# Patient Record
Sex: FEMALE | Race: WHITE | NOT HISPANIC OR LATINO | Employment: OTHER | URBAN - METROPOLITAN AREA
[De-identification: names, ages, dates, MRNs, and addresses within clinical notes are randomized per-mention and may not be internally consistent; named-entity substitution may affect disease eponyms.]

---

## 2019-07-02 ENCOUNTER — TRANSCRIBE ORDERS (OUTPATIENT)
Dept: NEUROSURGERY | Facility: CLINIC | Age: 59
End: 2019-07-02

## 2019-07-02 DIAGNOSIS — G89.4 CHRONIC PAIN SYNDROME: Primary | ICD-10-CM

## 2019-07-22 ENCOUNTER — CONSULT (OUTPATIENT)
Dept: NEUROSURGERY | Facility: CLINIC | Age: 59
End: 2019-07-22
Payer: COMMERCIAL

## 2019-07-22 VITALS
DIASTOLIC BLOOD PRESSURE: 70 MMHG | BODY MASS INDEX: 30.64 KG/M2 | HEIGHT: 67 IN | SYSTOLIC BLOOD PRESSURE: 148 MMHG | RESPIRATION RATE: 16 BRPM | TEMPERATURE: 97.7 F | WEIGHT: 195.2 LBS

## 2019-07-22 DIAGNOSIS — M96.1 POSTLAMINECTOMY SYNDROME: ICD-10-CM

## 2019-07-22 DIAGNOSIS — G89.4 CHRONIC PAIN SYNDROME: Primary | ICD-10-CM

## 2019-07-22 PROCEDURE — 99205 OFFICE O/P NEW HI 60 MIN: CPT | Performed by: NEUROLOGICAL SURGERY

## 2019-07-22 RX ORDER — BIOTIN 1 MG
TABLET ORAL
COMMUNITY

## 2019-07-22 RX ORDER — ATORVASTATIN CALCIUM 20 MG/1
TABLET, FILM COATED ORAL
COMMUNITY
Start: 2019-06-07

## 2019-07-22 RX ORDER — ESZOPICLONE 2 MG/1
2 TABLET, FILM COATED ORAL
COMMUNITY
Start: 2019-04-15

## 2019-07-22 RX ORDER — ASPIRIN 81 MG/1
81 TABLET, CHEWABLE ORAL
COMMUNITY
Start: 2017-05-04 | End: 2019-08-09 | Stop reason: HOSPADM

## 2019-07-22 RX ORDER — DULOXETIN HYDROCHLORIDE 60 MG/1
CAPSULE, DELAYED RELEASE ORAL
COMMUNITY
Start: 2019-06-10

## 2019-07-22 RX ORDER — CHLORHEXIDINE GLUCONATE 0.12 MG/ML
15 RINSE ORAL ONCE
Status: CANCELLED | OUTPATIENT
Start: 2019-08-09 | End: 2019-07-22

## 2019-07-22 RX ORDER — VANCOMYCIN HYDROCHLORIDE 1 G/200ML
1000 INJECTION, SOLUTION INTRAVENOUS ONCE
Status: CANCELLED | OUTPATIENT
Start: 2019-08-09 | End: 2019-07-22

## 2019-07-22 RX ORDER — RANITIDINE 150 MG/1
150 TABLET ORAL
COMMUNITY
Start: 2018-11-12

## 2019-07-22 RX ORDER — TIZANIDINE 4 MG/1
4 TABLET ORAL
COMMUNITY
Start: 2019-04-15 | End: 2019-08-08 | Stop reason: SDUPTHER

## 2019-07-22 RX ORDER — METHADONE HYDROCHLORIDE 5 MG/1
TABLET ORAL
COMMUNITY
Start: 2019-03-21

## 2019-07-22 RX ORDER — FLUTICASONE PROPIONATE 50 MCG
2 SPRAY, SUSPENSION (ML) NASAL
COMMUNITY
Start: 2019-04-15

## 2019-07-22 RX ORDER — GABAPENTIN 800 MG/1
800 TABLET ORAL 3 TIMES DAILY
COMMUNITY
Start: 2019-02-16

## 2019-07-22 RX ORDER — AMLODIPINE BESYLATE 5 MG/1
5 TABLET ORAL
COMMUNITY
Start: 2019-05-03

## 2019-07-22 NOTE — H&P (VIEW-ONLY)
Assessment/Plan:    No problem-specific Assessment & Plan notes found for this encounter  Patient is stable  Symptoms, as detailed in HPI, continue to significantly impact of patient's quality of life in daily activities  After carefully considering presentation, investigations, functional status and co-morbidities, the risk/benefit profile of surgical intervention is favorable  History, physical examination and diagnostic tests were reviewed and questions answered  Diagnosis, care plan and treatment options were discussed  The patient understand instructions and will follow up as directed  OR for thoracic laminectomy for spinal cord sitmulator implant  Carrie Estevez  She got 80% relief of pain during the trial  We will remove her old stimulator which does not provide any relief  Expected postoperative course, including activity restrictions, expected pain and postoperative medication were reviewed  Patient provided verbal consent to surgical procedure and signed consent form: Yes    We also discussed the risks and benefits of the procedure the risks being including: infection (~2%), neurologic injury (<1%), new pain, revisions surgery, failure to relieve pain, hardware issues  The benefits including relief of pain comparable to trial  The patient stated understanding of the risks and benefits and agreed to proceed  Diagnoses and all orders for this visit:    Chronic pain syndrome  -     Ambulatory referral to Neurosurgery  -     UA w Reflex to Microscopic w Reflex to Culture  -     Comprehensive metabolic panel; Future  -     CBC and differential; Future  -     APTT; Future  -     Protime-INR; Future  -     HEMOGLOBIN A1C W/ EAG ESTIMATION; Future  -     Case request operating room: LAMINECTOMY THORACIC FOR INSERTION DORSAL COLUMN SPINAL CORD STIMULATOR (DCS) W IMPLANTABLE PULSE GENERATOR, RIGHT BUTTOCK   REMOVAL OF PRIOR SPINAL CORD STIMULATOR; Standing  -     Case request operating room: LAMINECTOMY THORACIC FOR INSERTION DORSAL COLUMN SPINAL CORD STIMULATOR (DCS) W IMPLANTABLE PULSE GENERATOR, RIGHT BUTTOCK  REMOVAL OF PRIOR SPINAL CORD STIMULATOR    Postlaminectomy syndrome  -     Case request operating room: LAMINECTOMY THORACIC FOR INSERTION DORSAL COLUMN SPINAL CORD STIMULATOR (DCS) W IMPLANTABLE PULSE GENERATOR, RIGHT BUTTOCK  REMOVAL OF PRIOR SPINAL CORD STIMULATOR; Standing  -     Case request operating room: LAMINECTOMY THORACIC FOR INSERTION DORSAL COLUMN SPINAL CORD STIMULATOR (DCS) W IMPLANTABLE PULSE GENERATOR, RIGHT BUTTOCK  REMOVAL OF PRIOR SPINAL CORD STIMULATOR    Other orders  -     Calcium Carbonate-Vitamin D (CALCIUM 500/D PO); ONE TABLET BID  -     amLODIPine (NORVASC) 5 mg tablet; Take 5 mg by mouth  -     aspirin 81 mg chewable tablet; Chew 81 mg  -     atorvastatin (LIPITOR) 20 mg tablet; TAKE 1 TABLET BY MOUTH EVERY DAY  -     Cholecalciferol (VITAMIN D3) 1000 units CAPS; Take by mouth  -     DULoxetine (CYMBALTA) 60 mg delayed release capsule; TAKE 1 CAPSULE BY MOUTH EVERY DAY  -     eszopiclone (LUNESTA) 2 mg tablet; Take 2 mg by mouth  -     fluticasone (FLONASE) 50 mcg/act nasal spray; 2 sprays into each nostril  -     gabapentin (NEURONTIN) 800 mg tablet; Take 800 mg by mouth  -     methadone (DOLOPHINE) 5 mg tablet; TAKE 1/2 TABLET (2 5MG) BY MOUTH EVERY 8 HOURS FOR PAIN (ONGOING THERAPY)  -     ranitidine (ZANTAC) 150 mg tablet; Take 150 mg by mouth  -     tiZANidine (ZANAFLEX) 4 mg tablet;  Take 4 mg by mouth  -     Diet NPO; Sips with meds; Standing  -     Nursing Communication [facility] Interventions Implemented; Standing  -     Nursing Communication Use 2 CHG cloths, have the patient wash his/her body from the neck down or have staff wash entire body (from neck down) if patient is unable; Standing  -     Nursing Communication Swab both nares with Povidone-Iodine solution, EXCLUDE if patient has shellfish/Iodine allergy; Standing  -     chlorhexidine ([facility]) 0 12 % oral rinse 15 mL  -     Void on call to OR; Standing  -     Insert peripheral IV; Standing  -     vancomycin (VANCOCIN) 1,000 mg in sodium chloride 0 9 % 500 mL IVPB          Subjective:      Patient ID: Maranda Manning is a 63 y o  female  Patient is a 63year old female with symptoms of low back and hip down the leg pain  Pain is severe throbbing in quality  She had a prior stimualtor and has lost efficacy because they have migrated per patient  She sees Dr Srinivasan and she did well with a [employer] trial with 80% relief of pain  And improvement in ambulation and sleep  The following portions of the patient's history were reviewed and updated as appropriate:   She  has a past medical history of Hypertension  She There are no active problems to display for this patient  She  has a past surgical history that includes Cholecystectomy; Lumbar fusion; and Foot surgery (Right)  Her family history is not on file  She  has no tobacco, alcohol, and drug history on file    Current Outpatient Medications   Medication Sig Dispense Refill    amLODIPine (NORVASC) 5 mg tablet Take 5 mg by mouth      aspirin 81 mg chewable tablet Chew 81 mg      atorvastatin (LIPITOR) 20 mg tablet TAKE 1 TABLET BY MOUTH EVERY DAY      Calcium Carbonate-Vitamin D (CALCIUM 500/D PO) ONE TABLET BID      DULoxetine (CYMBALTA) 60 mg delayed release capsule TAKE 1 CAPSULE BY MOUTH EVERY DAY      eszopiclone (LUNESTA) 2 mg tablet Take 2 mg by mouth      fluticasone (FLONASE) 50 mcg/act nasal spray 2 sprays into each nostril      gabapentin (NEURONTIN) 800 mg tablet Take 800 mg by mouth      methadone (DOLOPHINE) 5 mg tablet TAKE 1/2 TABLET (2 5MG) BY MOUTH EVERY 8 HOURS FOR PAIN (ONGOING THERAPY)      ranitidine (ZANTAC) 150 mg tablet Take 150 mg by mouth      tiZANidine (ZANAFLEX) 4 mg tablet Take 4 mg by mouth      Cholecalciferol (VITAMIN D3) 1000 units CAPS Take by mouth       No current facility-administered medications for this visit  Current Outpatient Medications on File Prior to Visit   Medication Sig    amLODIPine (NORVASC) 5 mg tablet Take 5 mg by mouth    aspirin 81 mg chewable tablet Chew 81 mg    atorvastatin (LIPITOR) 20 mg tablet TAKE 1 TABLET BY MOUTH EVERY DAY    Calcium Carbonate-Vitamin D (CALCIUM 500/D PO) ONE TABLET BID    DULoxetine (CYMBALTA) 60 mg delayed release capsule TAKE 1 CAPSULE BY MOUTH EVERY DAY    eszopiclone (LUNESTA) 2 mg tablet Take 2 mg by mouth    fluticasone (FLONASE) 50 mcg/act nasal spray 2 sprays into each nostril    gabapentin (NEURONTIN) 800 mg tablet Take 800 mg by mouth    methadone (DOLOPHINE) 5 mg tablet TAKE 1/2 TABLET (2 5MG) BY MOUTH EVERY 8 HOURS FOR PAIN (ONGOING THERAPY)    ranitidine (ZANTAC) 150 mg tablet Take 150 mg by mouth    tiZANidine (ZANAFLEX) 4 mg tablet Take 4 mg by mouth    Cholecalciferol (VITAMIN D3) 1000 units CAPS Take by mouth     No current facility-administered medications on file prior to visit  She is allergic to codeine and estrogens       Review of Systems   Constitutional: Positive for fatigue  HENT: Negative  Eyes: Negative  Respiratory: Negative  Cardiovascular: Negative  Gastrointestinal: Negative  Endocrine: Negative  Genitourinary: Negative  Musculoskeletal: Positive for back pain (Medtronic SCS was for LBP and left leg, leads migrated  Pt had BS SCS trial for lbp and bilateral leg pain  )  Allergic/Immunologic: Negative  Neurological: Positive for weakness (bilateral legs) and numbness (bilateral legs)  Hematological:        See vascular   Psychiatric/Behavioral: Negative  Objective:      /70 (BP Location: Right arm)   Temp 97 7 °F (36 5 °C) (Tympanic)   Resp 16   Ht 5' 7" (1 702 m)   Wt 88 5 kg (195 lb 3 2 oz)   BMI 30 57 kg/m²          Physical Exam   Constitutional: She is oriented to person, place, and time  She appears well-developed     HENT:   Head: Normocephalic and atraumatic  Nose: Nose normal    Eyes: Pupils are equal, round, and reactive to light  Conjunctivae and EOM are normal  Right eye exhibits no discharge  Left eye exhibits no discharge  Neck: Normal range of motion  No tracheal deviation present  No thyromegaly present  Cardiovascular: Normal rate  Pulmonary/Chest: Effort normal and breath sounds normal  No respiratory distress  Neurological: She is alert and oriented to person, place, and time  She has normal strength and normal reflexes  No cranial nerve deficit or sensory deficit  Skin: Skin is warm and dry  She is not diaphoretic  No erythema  No pallor  Psychiatric: She has a normal mood and affect   Her behavior is normal  Judgment and thought content normal

## 2019-07-22 NOTE — PROGRESS NOTES
Assessment/Plan:    No problem-specific Assessment & Plan notes found for this encounter  Patient is stable  Symptoms, as detailed in HPI, continue to significantly impact of patient's quality of life in daily activities  After carefully considering presentation, investigations, functional status and co-morbidities, the risk/benefit profile of surgical intervention is favorable  History, physical examination and diagnostic tests were reviewed and questions answered  Diagnosis, care plan and treatment options were discussed  The patient understand instructions and will follow up as directed  OR for thoracic laminectomy for spinal cord sitmulator implant  Dick Birmingham  She got 80% relief of pain during the trial  We will remove her old stimulator which does not provide any relief  Expected postoperative course, including activity restrictions, expected pain and postoperative medication were reviewed  Patient provided verbal consent to surgical procedure and signed consent form: Yes    We also discussed the risks and benefits of the procedure the risks being including: infection (~2%), neurologic injury (<1%), new pain, revisions surgery, failure to relieve pain, hardware issues  The benefits including relief of pain comparable to trial  The patient stated understanding of the risks and benefits and agreed to proceed  Diagnoses and all orders for this visit:    Chronic pain syndrome  -     Ambulatory referral to Neurosurgery  -     UA w Reflex to Microscopic w Reflex to Culture  -     Comprehensive metabolic panel; Future  -     CBC and differential; Future  -     APTT; Future  -     Protime-INR; Future  -     HEMOGLOBIN A1C W/ EAG ESTIMATION; Future  -     Case request operating room: LAMINECTOMY THORACIC FOR INSERTION DORSAL COLUMN SPINAL CORD STIMULATOR (DCS) W IMPLANTABLE PULSE GENERATOR, RIGHT BUTTOCK   REMOVAL OF PRIOR SPINAL CORD STIMULATOR; Standing  -     Case request operating room: LAMINECTOMY THORACIC FOR INSERTION DORSAL COLUMN SPINAL CORD STIMULATOR (DCS) W IMPLANTABLE PULSE GENERATOR, RIGHT BUTTOCK  REMOVAL OF PRIOR SPINAL CORD STIMULATOR    Postlaminectomy syndrome  -     Case request operating room: LAMINECTOMY THORACIC FOR INSERTION DORSAL COLUMN SPINAL CORD STIMULATOR (DCS) W IMPLANTABLE PULSE GENERATOR, RIGHT BUTTOCK  REMOVAL OF PRIOR SPINAL CORD STIMULATOR; Standing  -     Case request operating room: LAMINECTOMY THORACIC FOR INSERTION DORSAL COLUMN SPINAL CORD STIMULATOR (DCS) W IMPLANTABLE PULSE GENERATOR, RIGHT BUTTOCK  REMOVAL OF PRIOR SPINAL CORD STIMULATOR    Other orders  -     Calcium Carbonate-Vitamin D (CALCIUM 500/D PO); ONE TABLET BID  -     amLODIPine (NORVASC) 5 mg tablet; Take 5 mg by mouth  -     aspirin 81 mg chewable tablet; Chew 81 mg  -     atorvastatin (LIPITOR) 20 mg tablet; TAKE 1 TABLET BY MOUTH EVERY DAY  -     Cholecalciferol (VITAMIN D3) 1000 units CAPS; Take by mouth  -     DULoxetine (CYMBALTA) 60 mg delayed release capsule; TAKE 1 CAPSULE BY MOUTH EVERY DAY  -     eszopiclone (LUNESTA) 2 mg tablet; Take 2 mg by mouth  -     fluticasone (FLONASE) 50 mcg/act nasal spray; 2 sprays into each nostril  -     gabapentin (NEURONTIN) 800 mg tablet; Take 800 mg by mouth  -     methadone (DOLOPHINE) 5 mg tablet; TAKE 1/2 TABLET (2 5MG) BY MOUTH EVERY 8 HOURS FOR PAIN (ONGOING THERAPY)  -     ranitidine (ZANTAC) 150 mg tablet; Take 150 mg by mouth  -     tiZANidine (ZANAFLEX) 4 mg tablet;  Take 4 mg by mouth  -     Diet NPO; Sips with meds; Standing  -     Nursing Communication [facility] Interventions Implemented; Standing  -     Nursing Communication Use 2 CHG cloths, have the patient wash his/her body from the neck down or have staff wash entire body (from neck down) if patient is unable; Standing  -     Nursing Communication Swab both nares with Povidone-Iodine solution, EXCLUDE if patient has shellfish/Iodine allergy; Standing  -     chlorhexidine ([address] [facility]) 0 12 % oral rinse 15 mL  -     Void on call to OR; Standing  -     Insert peripheral IV; Standing  -     vancomycin (VANCOCIN) 1,000 mg in sodium chloride 0 9 % 500 mL IVPB          Subjective:      Patient ID: Nimco Valles is a 63 y o  female  Patient is a 63year old female with symptoms of low back and hip down the leg pain  Pain is severe throbbing in quality  She had a prior stimualtor and has lost efficacy because they have migrated per patient  She sees Dr Srinivasan and she did well with a Solartrec trial with 80% relief of pain  And improvement in ambulation and sleep  The following portions of the patient's history were reviewed and updated as appropriate:   She  has a past medical history of Hypertension  She There are no active problems to display for this patient  She  has a past surgical history that includes Cholecystectomy; Lumbar fusion; and Foot surgery (Right)  Her family history is not on file  She  has no tobacco, alcohol, and drug history on file    Current Outpatient Medications   Medication Sig Dispense Refill    amLODIPine (NORVASC) 5 mg tablet Take 5 mg by mouth      aspirin 81 mg chewable tablet Chew 81 mg      atorvastatin (LIPITOR) 20 mg tablet TAKE 1 TABLET BY MOUTH EVERY DAY      Calcium Carbonate-Vitamin D (CALCIUM 500/D PO) ONE TABLET BID      DULoxetine (CYMBALTA) 60 mg delayed release capsule TAKE 1 CAPSULE BY MOUTH EVERY DAY      eszopiclone (LUNESTA) 2 mg tablet Take 2 mg by mouth      fluticasone (FLONASE) 50 mcg/act nasal spray 2 sprays into each nostril      gabapentin (NEURONTIN) 800 mg tablet Take 800 mg by mouth      methadone (DOLOPHINE) 5 mg tablet TAKE 1/2 TABLET (2 5MG) BY MOUTH EVERY 8 HOURS FOR PAIN (ONGOING THERAPY)      ranitidine (ZANTAC) 150 mg tablet Take 150 mg by mouth      tiZANidine (ZANAFLEX) 4 mg tablet Take 4 mg by mouth      Cholecalciferol (VITAMIN D3) 1000 units CAPS Take by mouth       No current facility-administered medications for this visit  Current Outpatient Medications on File Prior to Visit   Medication Sig    amLODIPine (NORVASC) 5 mg tablet Take 5 mg by mouth    aspirin 81 mg chewable tablet Chew 81 mg    atorvastatin (LIPITOR) 20 mg tablet TAKE 1 TABLET BY MOUTH EVERY DAY    Calcium Carbonate-Vitamin D (CALCIUM 500/D PO) ONE TABLET BID    DULoxetine (CYMBALTA) 60 mg delayed release capsule TAKE 1 CAPSULE BY MOUTH EVERY DAY    eszopiclone (LUNESTA) 2 mg tablet Take 2 mg by mouth    fluticasone (FLONASE) 50 mcg/act nasal spray 2 sprays into each nostril    gabapentin (NEURONTIN) 800 mg tablet Take 800 mg by mouth    methadone (DOLOPHINE) 5 mg tablet TAKE 1/2 TABLET (2 5MG) BY MOUTH EVERY 8 HOURS FOR PAIN (ONGOING THERAPY)    ranitidine (ZANTAC) 150 mg tablet Take 150 mg by mouth    tiZANidine (ZANAFLEX) 4 mg tablet Take 4 mg by mouth    Cholecalciferol (VITAMIN D3) 1000 units CAPS Take by mouth     No current facility-administered medications on file prior to visit  She is allergic to codeine and estrogens       Review of Systems   Constitutional: Positive for fatigue  HENT: Negative  Eyes: Negative  Respiratory: Negative  Cardiovascular: Negative  Gastrointestinal: Negative  Endocrine: Negative  Genitourinary: Negative  Musculoskeletal: Positive for back pain (Medtronic SCS was for LBP and left leg, leads migrated  Pt had BS SCS trial for lbp and bilateral leg pain  )  Allergic/Immunologic: Negative  Neurological: Positive for weakness (bilateral legs) and numbness (bilateral legs)  Hematological:        See vascular   Psychiatric/Behavioral: Negative  Objective:      /70 (BP Location: Right arm)   Temp 97 7 °F (36 5 °C) (Tympanic)   Resp 16   Ht 5' 7" (1 702 m)   Wt 88 5 kg (195 lb 3 2 oz)   BMI 30 57 kg/m²          Physical Exam   Constitutional: She is oriented to person, place, and time  She appears well-developed     HENT:   Head: Normocephalic and atraumatic  Nose: Nose normal    Eyes: Pupils are equal, round, and reactive to light  Conjunctivae and EOM are normal  Right eye exhibits no discharge  Left eye exhibits no discharge  Neck: Normal range of motion  No tracheal deviation present  No thyromegaly present  Cardiovascular: Normal rate  Pulmonary/Chest: Effort normal and breath sounds normal  No respiratory distress  Neurological: She is alert and oriented to person, place, and time  She has normal strength and normal reflexes  No cranial nerve deficit or sensory deficit  Skin: Skin is warm and dry  She is not diaphoretic  No erythema  No pallor  Psychiatric: She has a normal mood and affect   Her behavior is normal  Judgment and thought content normal

## 2019-07-30 ENCOUNTER — TELEPHONE (OUTPATIENT)
Dept: NEUROSURGERY | Facility: CLINIC | Age: 59
End: 2019-07-30

## 2019-08-02 NOTE — TELEPHONE ENCOUNTER
Signed surgical consent in the presence of surgeon after procedure explained: LAMINECTOMY THORACIC FOR INSERTION DORSAL COLUMN SPINAL CORD STIMULATOR (DCS) W IMPLANTABLE PULSE GENERATOR, RIGHT BUTTOCK  REMOVAL OF PRIOR SPINAL CORD STIMULATOR (Right Spine Thoracic)      Assessment for comorbid medical conditions:   HO adverse response to general anesthesia:---denies   Surgical Procedures past 6 months:Trial ---June 2019 dr Eulalio Allison 1/2 per days--- will discuss w/ PCP nicotine replacement products -  Attempt to stop before d/c , reviewed in detain CDB exercises postoperatively   Endocrine:  Denies DM , denies hypothyroidism  MISC/Oncology/hematology : ---denies   Skin intact//GI- (urostomy, colostomy, ileostomy, intermittent  Catheterization):----denies   Personal history of venous thromboembolic disease: denies   Imagining: Received DISC as per Dr Blaze Lagunas MA need uploading , thoracicCT  is current 2019     Pain management:  Dr Lore David [facility] --methadone 5 mg TID  , Tizanidine --Dr    Medications preoperative and postoperative (AC, Antiplatelet, ASA, NSAID, vitamins , dietary supplements , OTC) --ASA --    Discussed overview of surgical process from office appointment thru 6 weeks post op:--done     Patient verbalized understanding, all questions were answered and contact information provided in the event future questions arise         PAT completed -----done 8/10   PCP clearance today

## 2019-08-04 NOTE — TELEPHONE ENCOUNTER
AS PER DEANGELO'S NOTE ; OR for thoracic laminectomy for spinal cord sitmulator implant  Elizabeth Carrizales   She got 80% relief of pain during the trial  We will remove her old stimulator which does not provide any relief      CT [facility] SPINE UPLOADED IMAGES IN PACS 4/1/2019

## 2019-08-06 RX ORDER — LANCETS
EACH MISCELLANEOUS
COMMUNITY
Start: 2019-08-02 | End: 2019-08-26

## 2019-08-06 RX ORDER — SULFAMETHOXAZOLE AND TRIMETHOPRIM 800; 160 MG/1; MG/1
1 TABLET ORAL EVERY 12 HOURS SCHEDULED
COMMUNITY
End: 2019-08-26

## 2019-08-06 NOTE — PRE-PROCEDURE INSTRUCTIONS
Pre-Surgery Instructions:   Medication Instructions    amLODIPine (NORVASC) 5 mg tablet Instructed patient per Anesthesia Guidelines   aspirin 81 mg chewable tablet Patient was instructed by Physician and understands   atorvastatin (LIPITOR) 20 mg tablet Instructed patient per Anesthesia Guidelines   Calcium Carbonate-Vitamin D (CALCIUM 500/D PO) Patient was instructed by Physician and understands   Cholecalciferol (VITAMIN D3) 1000 units CAPS Patient was instructed by Physician and understands   DULoxetine (CYMBALTA) 60 mg delayed release capsule Instructed patient per Anesthesia Guidelines   eszopiclone (LUNESTA) 2 mg tablet Instructed patient per Anesthesia Guidelines   fluticasone (FLONASE) 50 mcg/act nasal spray Instructed patient per Anesthesia Guidelines   gabapentin (NEURONTIN) 800 mg tablet Instructed patient per Anesthesia Guidelines   Lancets (ONETOUCH ULTRASOFT) lancets Instructed patient per Anesthesia Guidelines   methadone (DOLOPHINE) 5 mg tablet Instructed patient per Anesthesia Guidelines   ranitidine (ZANTAC) 150 mg tablet Instructed patient per Anesthesia Guidelines   sulfamethoxazole-trimethoprim (BACTRIM DS) 800-160 mg per tablet Patient was instructed by Physician and understands   tiZANidine (ZANAFLEX) 4 mg tablet Instructed patient per Anesthesia Guidelines

## 2019-08-06 NOTE — PRE-PROCEDURE INSTRUCTIONS
Pre-Surgery Instructions:   Medication Instructions    amLODIPine (NORVASC) 5 mg tablet Instructed patient per Anesthesia Guidelines   aspirin 81 mg chewable tablet Patient was instructed by Physician and understands   atorvastatin (LIPITOR) 20 mg tablet Instructed patient per Anesthesia Guidelines   Calcium Carbonate-Vitamin D (CALCIUM 500/D PO) Patient was instructed by Physician and understands   Cholecalciferol (VITAMIN D3) 1000 units CAPS Patient was instructed by Physician and understands   DULoxetine (CYMBALTA) 60 mg delayed release capsule Instructed patient per Anesthesia Guidelines   eszopiclone (LUNESTA) 2 mg tablet Instructed patient per Anesthesia Guidelines   fluticasone (FLONASE) 50 mcg/act nasal spray Instructed patient per Anesthesia Guidelines   gabapentin (NEURONTIN) 800 mg tablet Instructed patient per Anesthesia Guidelines   Lancets (ONETOUCH ULTRASOFT) lancets Patient was instructed by Physician and understands   methadone (DOLOPHINE) 5 mg tablet Instructed patient per Anesthesia Guidelines   ranitidine (ZANTAC) 150 mg tablet Instructed patient per Anesthesia Guidelines   tiZANidine (ZANAFLEX) 4 mg tablet Instructed patient per Anesthesia Guidelines  Before your operation, you play an important role in decreasing your risk for infection by washing with special antiseptic soap  This is an effective way to reduce bacteria on the skin which may help to prevent infections at the surgical site  Please read the following directions in advance  1  In the week before your operation purchase a 4 ounce bottle of antiseptic soap containing chlorhexidine gluconate 4%  Some brand names include: Aplicare, Endure, and Hibiclens  The cost is usually less than $5 00  · For your convenience, the [facility] carries the soap  · It may also be available at your doctor's office or pre-admission testing center, and at most retail pharmacies    · If you are allergic or sensitive to soaps containing chlorhexidine gluconate (CHG), please let your doctor know so another antiseptic soap can be suggested  · CHG antiseptic soap is for external use only  2      The day before your operation follow these directions carefully to get ready  · Place clean lines (sheets) on your bed; you should sleep on clean sheets after your evening shower  · Get clean towels and washcloths ready - you need enough for 2 showers  · Set aside clean underwear, pajamas, and clothing to wear after the shower  Reminders:  · DO NOT use any other soap or body rinse on your skin during or after the antiseptic showers  · DO NOT use lotion , powder, deodorant, or perfume/aftershave of any kind on your skin after your antiseptic shower  · DO NOT shave any body parts in the 24 hours/the day before your operation  · DO NOT get the antiseptic soap in your eyes, ears, nose, mouth, or vaginal area  3      You will need to shower the night before AND the morning of your Surgery  Shower 1:  · The evening before your operation, take the fist shower  · First, shampoo your hair with regular shampoo and rinse it completely before you use the anitseptic soap  After washing and rinsing your hair, rinse your body  · Next, use a clean wash cloth to apply the antiseptic soap and wash your body from the neck down to your toes using 1/2 bottle of the antiseptic soap  You will use the other 1/2 bottle for the second shower  · Clean the area where your incision will be; later this area well for about 2 minutes  · If you ar having head or neck surgery, wash areas with the antiseptic soap  · Rinse yourself completely with running water  · Use a clean towel to dry off  · Wear clean underwear and clothing/pajamas  Shower 2:  · The Morning of your operation, take the second shower following the same steps as Shower 1 using the second 1/2 of the bottle of antiseptic soap    · Use clean cloths and towels to was and dry yourself off  · Wear clean underwear and clothing

## 2019-08-07 ENCOUNTER — ANESTHESIA EVENT (OUTPATIENT)
Dept: PERIOP | Facility: HOSPITAL | Age: 59
End: 2019-08-07
Payer: COMMERCIAL

## 2019-08-07 DIAGNOSIS — Z01.818 PREOPERATIVE CLEARANCE: ICD-10-CM

## 2019-08-07 DIAGNOSIS — G89.4 CHRONIC PAIN SYNDROME: Primary | ICD-10-CM

## 2019-08-07 DIAGNOSIS — M96.1 POSTLAMINECTOMY SYNDROME: ICD-10-CM

## 2019-08-08 ENCOUNTER — DOCUMENTATION (OUTPATIENT)
Dept: NEUROSURGERY | Facility: CLINIC | Age: 59
End: 2019-08-08

## 2019-08-08 DIAGNOSIS — Z98.890 POST-OPERATIVE STATE: ICD-10-CM

## 2019-08-08 DIAGNOSIS — Z79.2 PROPHYLACTIC ANTIBIOTIC: ICD-10-CM

## 2019-08-08 DIAGNOSIS — G89.18 POST-OP PAIN: Primary | ICD-10-CM

## 2019-08-08 RX ORDER — TIZANIDINE HYDROCHLORIDE 4 MG/1
4 CAPSULE, GELATIN COATED ORAL 3 TIMES DAILY
Qty: 30 CAPSULE | Refills: 0 | Status: SHIPPED | OUTPATIENT
Start: 2019-08-08

## 2019-08-08 RX ORDER — CEPHALEXIN 500 MG/1
500 CAPSULE ORAL EVERY 6 HOURS SCHEDULED
Qty: 12 CAPSULE | Refills: 0 | Status: SHIPPED | OUTPATIENT
Start: 2019-08-08 | End: 2019-08-11

## 2019-08-08 RX ORDER — OXYCODONE HYDROCHLORIDE AND ACETAMINOPHEN 5; 325 MG/1; MG/1
1 TABLET ORAL EVERY 4 HOURS PRN
Qty: 30 TABLET | Refills: 0 | Status: SHIPPED | OUTPATIENT
Start: 2019-08-08 | End: 2019-09-23

## 2019-08-08 NOTE — TELEPHONE ENCOUNTER
s/w patient that is scheduled for surgery tomorrow, 08/09/19  Verified allergies and went over pre-op instructions, including bathing and NPO status, advised may take medications with sip in AM and follow instructions provided by PAT  Patient currently denies taking any blood thinning medications  Discontinued ASA 7 days ago  Confirmed pharmacy on file and sent over post-op antibiotic, muscle relaxer, percocet, and explained when to start taking it  Dr Renan Moore of [facility] will not be prescribing post op pain meds  Advised patient she should not be taking any pain meds prescribed by PM in the post op period  She will be notifying Dr Renan Moore of her post op med regimen  She had no additional questions or concerns at this time

## 2019-08-09 ENCOUNTER — ANESTHESIA (OUTPATIENT)
Dept: PERIOP | Facility: HOSPITAL | Age: 59
End: 2019-08-09
Payer: COMMERCIAL

## 2019-08-09 ENCOUNTER — HOSPITAL ENCOUNTER (OUTPATIENT)
Facility: HOSPITAL | Age: 59
Setting detail: OUTPATIENT SURGERY
Discharge: HOME | End: 2019-08-09
Attending: NEUROLOGICAL SURGERY | Admitting: NEUROLOGICAL SURGERY
Payer: COMMERCIAL

## 2019-08-09 ENCOUNTER — APPOINTMENT (OUTPATIENT)
Dept: RADIOLOGY | Facility: HOSPITAL | Age: 59
End: 2019-08-09
Payer: COMMERCIAL

## 2019-08-09 VITALS
HEIGHT: 67 IN | HEART RATE: 68 BPM | BODY MASS INDEX: 29.7 KG/M2 | RESPIRATION RATE: 16 BRPM | DIASTOLIC BLOOD PRESSURE: 65 MMHG | WEIGHT: 189.25 LBS | TEMPERATURE: 97.4 F | OXYGEN SATURATION: 97 % | SYSTOLIC BLOOD PRESSURE: 149 MMHG

## 2019-08-09 PROCEDURE — 63664 REVISE SPINE ELTRD PLATE: CPT | Performed by: PHYSICIAN ASSISTANT

## 2019-08-09 PROCEDURE — 63685 INS/RPLC SPI NPG/RCVR POCKET: CPT | Performed by: NEUROLOGICAL SURGERY

## 2019-08-09 PROCEDURE — C1820 GENERATOR NEURO RECHG BAT SY: HCPCS | Performed by: NEUROLOGICAL SURGERY

## 2019-08-09 PROCEDURE — C1778 LEAD, NEUROSTIMULATOR: HCPCS | Performed by: NEUROLOGICAL SURGERY

## 2019-08-09 PROCEDURE — 63664 REVISE SPINE ELTRD PLATE: CPT | Performed by: NEUROLOGICAL SURGERY

## 2019-08-09 PROCEDURE — 63685 INS/RPLC SPI NPG/RCVR POCKET: CPT | Performed by: PHYSICIAN ASSISTANT

## 2019-08-09 PROCEDURE — 72070 X-RAY EXAM THORAC SPINE 2VWS: CPT

## 2019-08-09 DEVICE — IMPLANTABLE PULSE GENERATOR KIT
Type: IMPLANTABLE DEVICE | Status: FUNCTIONAL
Brand: SPECTRA WAVEWRITER™

## 2019-08-09 DEVICE — 50CM 4X8 SURGICAL LEAD KIT
Type: IMPLANTABLE DEVICE | Status: FUNCTIONAL
Brand: COVEREDGE™ 32

## 2019-08-09 RX ORDER — PROPOFOL 10 MG/ML
INJECTION, EMULSION INTRAVENOUS CONTINUOUS PRN
Status: DISCONTINUED | OUTPATIENT
Start: 2019-08-09 | End: 2019-08-09

## 2019-08-09 RX ORDER — HYDROMORPHONE HCL/PF 1 MG/ML
0.5 SYRINGE (ML) INJECTION
Status: DISCONTINUED | OUTPATIENT
Start: 2019-08-09 | End: 2019-08-09 | Stop reason: HOSPADM

## 2019-08-09 RX ORDER — OXYCODONE HYDROCHLORIDE AND ACETAMINOPHEN 5; 325 MG/1; MG/1
2 TABLET ORAL EVERY 4 HOURS PRN
Status: DISCONTINUED | OUTPATIENT
Start: 2019-08-09 | End: 2019-08-09 | Stop reason: HOSPADM

## 2019-08-09 RX ORDER — MIDAZOLAM HYDROCHLORIDE 1 MG/ML
INJECTION INTRAMUSCULAR; INTRAVENOUS AS NEEDED
Status: DISCONTINUED | OUTPATIENT
Start: 2019-08-09 | End: 2019-08-09 | Stop reason: SURG

## 2019-08-09 RX ORDER — FENTANYL CITRATE/PF 50 MCG/ML
25 SYRINGE (ML) INJECTION
Status: DISCONTINUED | OUTPATIENT
Start: 2019-08-09 | End: 2019-08-09 | Stop reason: HOSPADM

## 2019-08-09 RX ORDER — GLYCOPYRROLATE 0.2 MG/ML
INJECTION INTRAMUSCULAR; INTRAVENOUS AS NEEDED
Status: DISCONTINUED | OUTPATIENT
Start: 2019-08-09 | End: 2019-08-09 | Stop reason: SURG

## 2019-08-09 RX ORDER — LIDOCAINE HYDROCHLORIDE 10 MG/ML
INJECTION, SOLUTION INFILTRATION; PERINEURAL AS NEEDED
Status: DISCONTINUED | OUTPATIENT
Start: 2019-08-09 | End: 2019-08-09 | Stop reason: SURG

## 2019-08-09 RX ORDER — VANCOMYCIN HYDROCHLORIDE 1 G/20ML
INJECTION, POWDER, LYOPHILIZED, FOR SOLUTION INTRAVENOUS AS NEEDED
Status: DISCONTINUED | OUTPATIENT
Start: 2019-08-09 | End: 2019-08-09 | Stop reason: HOSPADM

## 2019-08-09 RX ORDER — LIDOCAINE HYDROCHLORIDE AND EPINEPHRINE 10; 10 MG/ML; UG/ML
INJECTION, SOLUTION INFILTRATION; PERINEURAL AS NEEDED
Status: DISCONTINUED | OUTPATIENT
Start: 2019-08-09 | End: 2019-08-09 | Stop reason: HOSPADM

## 2019-08-09 RX ORDER — SODIUM CHLORIDE, SODIUM LACTATE, POTASSIUM CHLORIDE, CALCIUM CHLORIDE 600; 310; 30; 20 MG/100ML; MG/100ML; MG/100ML; MG/100ML
75 INJECTION, SOLUTION INTRAVENOUS CONTINUOUS
Status: DISCONTINUED | OUTPATIENT
Start: 2019-08-09 | End: 2019-08-09 | Stop reason: HOSPADM

## 2019-08-09 RX ORDER — VANCOMYCIN HYDROCHLORIDE 1 G/200ML
1000 INJECTION, SOLUTION INTRAVENOUS ONCE
Status: COMPLETED | OUTPATIENT
Start: 2019-08-09 | End: 2019-08-09

## 2019-08-09 RX ORDER — SUCCINYLCHOLINE/SOD CL,ISO/PF 100 MG/5ML
SYRINGE (ML) INTRAVENOUS AS NEEDED
Status: DISCONTINUED | OUTPATIENT
Start: 2019-08-09 | End: 2019-08-09 | Stop reason: SURG

## 2019-08-09 RX ORDER — BUPIVACAINE HYDROCHLORIDE 2.5 MG/ML
INJECTION, SOLUTION INFILTRATION; PERINEURAL AS NEEDED
Status: DISCONTINUED | OUTPATIENT
Start: 2019-08-09 | End: 2019-08-09 | Stop reason: HOSPADM

## 2019-08-09 RX ORDER — ONDANSETRON 2 MG/ML
4 INJECTION INTRAMUSCULAR; INTRAVENOUS ONCE
Status: DISCONTINUED | OUTPATIENT
Start: 2019-08-09 | End: 2019-08-09 | Stop reason: HOSPADM

## 2019-08-09 RX ORDER — ROCURONIUM BROMIDE 10 MG/ML
INJECTION, SOLUTION INTRAVENOUS AS NEEDED
Status: DISCONTINUED | OUTPATIENT
Start: 2019-08-09 | End: 2019-08-09 | Stop reason: SURG

## 2019-08-09 RX ORDER — EPHEDRINE SULFATE 50 MG/ML
INJECTION INTRAVENOUS AS NEEDED
Status: DISCONTINUED | OUTPATIENT
Start: 2019-08-09 | End: 2019-08-09 | Stop reason: SURG

## 2019-08-09 RX ORDER — ONDANSETRON 2 MG/ML
INJECTION INTRAMUSCULAR; INTRAVENOUS AS NEEDED
Status: DISCONTINUED | OUTPATIENT
Start: 2019-08-09 | End: 2019-08-09 | Stop reason: SURG

## 2019-08-09 RX ORDER — DEXAMETHASONE SODIUM PHOSPHATE 4 MG/ML
INJECTION, SOLUTION INTRA-ARTICULAR; INTRALESIONAL; INTRAMUSCULAR; INTRAVENOUS; SOFT TISSUE AS NEEDED
Status: DISCONTINUED | OUTPATIENT
Start: 2019-08-09 | End: 2019-08-09 | Stop reason: SURG

## 2019-08-09 RX ORDER — DEXAMETHASONE SODIUM PHOSPHATE 4 MG/ML
4 INJECTION, SOLUTION INTRA-ARTICULAR; INTRALESIONAL; INTRAMUSCULAR; INTRAVENOUS; SOFT TISSUE ONCE
Status: DISCONTINUED | OUTPATIENT
Start: 2019-08-09 | End: 2019-08-09 | Stop reason: HOSPADM

## 2019-08-09 RX ORDER — PROPOFOL 10 MG/ML
INJECTION, EMULSION INTRAVENOUS AS NEEDED
Status: DISCONTINUED | OUTPATIENT
Start: 2019-08-09 | End: 2019-08-09 | Stop reason: SURG

## 2019-08-09 RX ORDER — CHLORHEXIDINE GLUCONATE 0.12 MG/ML
15 RINSE ORAL ONCE
Status: COMPLETED | OUTPATIENT
Start: 2019-08-09 | End: 2019-08-09

## 2019-08-09 RX ORDER — FENTANYL CITRATE 50 UG/ML
INJECTION, SOLUTION INTRAMUSCULAR; INTRAVENOUS AS NEEDED
Status: DISCONTINUED | OUTPATIENT
Start: 2019-08-09 | End: 2019-08-09 | Stop reason: SURG

## 2019-08-09 RX ADMIN — EPHEDRINE SULFATE 10 MG: 50 INJECTION, SOLUTION INTRAVENOUS at 12:25

## 2019-08-09 RX ADMIN — CHLORHEXIDINE GLUCONATE 0.12% ORAL RINSE 15 ML: 1.2 LIQUID ORAL at 09:31

## 2019-08-09 RX ADMIN — EPHEDRINE SULFATE 10 MG: 50 INJECTION, SOLUTION INTRAVENOUS at 12:20

## 2019-08-09 RX ADMIN — ROCURONIUM BROMIDE 5 MG: 10 INJECTION, SOLUTION INTRAVENOUS at 11:52

## 2019-08-09 RX ADMIN — OXYCODONE HYDROCHLORIDE AND ACETAMINOPHEN 1 TABLET: 5; 325 TABLET ORAL at 15:26

## 2019-08-09 RX ADMIN — PROPOFOL 50 MG: 10 INJECTION, EMULSION INTRAVENOUS at 12:10

## 2019-08-09 RX ADMIN — HYDROMORPHONE HYDROCHLORIDE 0.5 MG: 1 INJECTION, SOLUTION INTRAMUSCULAR; INTRAVENOUS; SUBCUTANEOUS at 15:19

## 2019-08-09 RX ADMIN — FENTANYL CITRATE 100 MCG: 50 INJECTION, SOLUTION INTRAMUSCULAR; INTRAVENOUS at 11:50

## 2019-08-09 RX ADMIN — VANCOMYCIN HYDROCHLORIDE 1000 MG: 1 INJECTION, SOLUTION INTRAVENOUS at 11:46

## 2019-08-09 RX ADMIN — MIDAZOLAM 2 MG: 1 INJECTION INTRAMUSCULAR; INTRAVENOUS at 11:43

## 2019-08-09 RX ADMIN — PROPOFOL 200 MG: 10 INJECTION, EMULSION INTRAVENOUS at 11:53

## 2019-08-09 RX ADMIN — GLYCOPYRROLATE 0.2 MG: 0.2 INJECTION, SOLUTION INTRAMUSCULAR; INTRAVENOUS at 11:45

## 2019-08-09 RX ADMIN — SODIUM CHLORIDE, SODIUM LACTATE, POTASSIUM CHLORIDE, AND CALCIUM CHLORIDE: .6; .31; .03; .02 INJECTION, SOLUTION INTRAVENOUS at 11:46

## 2019-08-09 RX ADMIN — SODIUM CHLORIDE, SODIUM LACTATE, POTASSIUM CHLORIDE, AND CALCIUM CHLORIDE 75 ML/HR: .6; .31; .03; .02 INJECTION, SOLUTION INTRAVENOUS at 09:31

## 2019-08-09 RX ADMIN — PROPOFOL 150 MCG/KG/MIN: 10 INJECTION, EMULSION INTRAVENOUS at 12:00

## 2019-08-09 RX ADMIN — Medication 100 MG: at 11:54

## 2019-08-09 RX ADMIN — DEXAMETHASONE SODIUM PHOSPHATE 4 MG: 4 INJECTION, SOLUTION INTRAMUSCULAR; INTRAVENOUS at 11:59

## 2019-08-09 RX ADMIN — ONDANSETRON 4 MG: 2 INJECTION INTRAMUSCULAR; INTRAVENOUS at 13:45

## 2019-08-09 RX ADMIN — LIDOCAINE HYDROCHLORIDE 15 MG: 10 INJECTION, SOLUTION INFILTRATION; PERINEURAL at 11:53

## 2019-08-09 RX ADMIN — SODIUM CHLORIDE, SODIUM LACTATE, POTASSIUM CHLORIDE, AND CALCIUM CHLORIDE: .6; .31; .03; .02 INJECTION, SOLUTION INTRAVENOUS at 13:39

## 2019-08-09 RX ADMIN — EPHEDRINE SULFATE 10 MG: 50 INJECTION, SOLUTION INTRAVENOUS at 12:47

## 2019-08-09 NOTE — ANESTHESIA PREPROCEDURE EVALUATION
Review of Systems/Medical History  Patient summary reviewed        Cardiovascular  Negative cardio ROS Hyperlipidemia, Hypertension controlled,   Comment: Left carotid dz  Complete blockage  No symptoms  Followed by vasc surgery  ,  Pulmonary  Negative pulmonary ROS Smoker cigarette smoker  , Tobacco cessation counseling given ,        GI/Hepatic  Negative GI/hepatic ROS   GERD well controlled,        Negative  ROS        Endo/Other  Negative endo/other ROS      GYN  Negative gynecology ROS          Hematology  Negative hematology ROS      Musculoskeletal  Negative musculoskeletal ROS        Neurology  Negative neurology ROS      Psychology   Negative psychology ROS   Chronic opioid dependence Chronic pain,   Comment: Methadone         Physical Exam    Airway    Mallampati score: II  TM Distance: >3 FB  Neck ROM: full     Dental   No notable dental hx upper dentures and lower dentures,     Cardiovascular  Comment: Negative ROS, Rate: normal, Cardiovascular exam normal    Pulmonary  Pulmonary exam normal     Other Findings        Anesthesia Plan  ASA Score- 2     Anesthesia Type- general with ASA Monitors  Additional Monitors:   Airway Plan: ETT  Plan Factors-    Induction- intravenous  Postoperative Plan- Plan for postoperative opioid use  Informed Consent- Anesthetic plan and risks discussed with patient  I personally reviewed this patient with the CRNA  Discussed and agreed on the Anesthesia Plan with the CRNA  John Mccarty

## 2019-08-09 NOTE — INTERVAL H&P NOTE
H&P reviewed  After examining the patient I find no changes in the patients condition since the H&P had been written    /60   Pulse 72   Temp 98 2 °F (36 8 °C) (Oral)   Resp 16   Ht 5' 7" (1 702 m)   Wt 85 8 kg (189 lb 4 oz)   SpO2 95%   BMI 29 64 kg/m²

## 2019-08-09 NOTE — DISCHARGE INSTRUCTIONS
Discharge Instructions  Spinal Cord Stimulator (SCS)    Activity:  1  Do not lift more than 10 pounds for 6 weeks  2  Avoid bending, lifting and twisting for 6 weeks  3  No strenuous activities  No driving for 2 weeks  4  When able to shower, continue to use clean towel and washcloth for 2 weeks post-op  5  Continue to change bed linens and pajamas more frequently  Wear clean clothes daily  Surgical incision care:  1  For Permanent SCS placement:  a  Keep incisions dry for 3 days  b  May shower with mild antimicrobial soap after 3 days  c  After 3 days, incisions may be left open to air, but must remain clean  2  Do not immerse the incisions in water for 6 weeks  3  Do not apply any creams or ointments to the incision for 6 weeks, unless otherwise instructed by [address] [place] [address]  4  Contact office if increasing redness, drainage, pain or swelling around the incisions  Postoperative medication:  1  Complete course of antibiotic as directed  a  For permanent spinal cord stimulators: 3 days  2  [address] will provide pain medication as coordinated with your pain specialist  All prescriptions must come from a single provider  a  Take all medications as prescribed  Call office with any questions/concerns  3  Please contact office for questions regarding dosage and modifications  4  No antiplatelet, anticoagulation or Nonsteroidal anti-inflammatory (NSAIDs) medication until cleared by [address], unless otherwise instructed  NO ASPIRIN UNTIL CLEARED BY OFFICE  5  Do not operate heavy machinery or vehicles while taking sedating medications  6  Use a bowel regimen while on opioids as they induce constipation  Ie  Senokot-S, Miralax, Colace, etc  Increase fiber and water intake  ***Note that it may take some time for the stimulator to improve chronic pain symptoms   Adjustment of the stimulator program can begin 2 weeks after placement  For TRIALS, there will be ongoing communication with the rep and adjustments as indicated  Please contact the stimulator representative if you have any questions regarding programing  ***

## 2019-08-09 NOTE — OP NOTE
OPERATIVE REPORT  PATIENT NAME: Katty Vo    :  1960  MRN: [id number]  Pt Location: [facility] OR ROOM 01    SURGERY DATE: 2019    Surgeon(s) and Role:     * John Sauer MD - Primary     * Jessica Jordan PA-C - Assisting    Preop Diagnosis:  Chronic pain syndrome [G89 4]  Postlaminectomy syndrome [M96 1]    Post-Op Diagnosis Codes:     * Chronic pain syndrome [G89 4]     * Postlaminectomy syndrome [M96 1]    Procedure(s) (LRB):  1) THORACIC LAMINECTOMY FOR REMOVAL OF PRIOR SPINAL CORD STIMULATOR AND RIGHT GENERATOR  2) LAMINECTOMY THORACIC FOR INSERTION DORSAL COLUMN SPINAL CORD STIMULATOR (DCS) W IMPLANTABLE PULSE GENERATOR, RIGHT BUTTOCK  Specimen(s):  * No specimens in log *    Estimated Blood Loss:   50 mL    Drains:  * No LDAs found *    Anesthesia Type:   General    Operative Indications:  Chronic pain syndrome [G89 4]  Postlaminectomy syndrome [M96 1]      Operative Findings:  See dictated note, epidural scar    Complications:   None    Procedure and Technique:  The patient was taken to operative theater induced under general anesthesia and intubated she was positioned prone a Andrea frame  The sweet spot during the trial was planned at T9  The patients prior incision was marked on the right buttock and the midline thoracic region  Then the patient was prepped and draped in sterile fashion, a timeout was performed  We began by making an incision along the old scar with a #10 Blade  We then used monopolar cautery to dissect down to the generator  The old generator was removed with the adapter and the wires were cut from the adapter  Next we made an incision with a 10 blade and performed electrocautery down the old thoracic incision  We dissected down subperiosteally until we exposed the lamina and interspinous spaces  We found the old paddle wires and these were dissected out slowly   We were able the dissect the leads down to its epidural entry at T8-9 at that point there was significant scar so I decided to dissect down at T9 to expose the old paddle  We were then able to remove the paddle  It appeared we had remove the complete old system  Next we had to perform T10 laminectomy to be able to place another new paddle electrode  While I was performing the laminectomy I had to dissect a significant amount of scarring  This did require more than 50% of the normal time it would take to perform a standard laminectomy to place a new paddle  I then was able to free enough epidural scar to pass a new paddle retrograde to cover the T9-10 interspace and T9 up to T8-9  The placement was fairly midline  We then tested the EMG response by altering the following parameters using a neurostimulation device  We programmed the following:    Frequency: 10-60 hertz   Pulse width of 350 us  Amplitudes: 0-10 mA   Contacts: midline contacts alternating contacts on the 32 contact electrode  Configuration: Bipolar with (+) and (-)  Cycling: None  Waveform: Tonic     We obtained the appropriate EMG response on the right and left leg proximally      The pocket on the right was already was created but we did have to dissect the pocket to create a new space for the next generator  We used cautery to do this  We tunneled that electrodes connected to the impulse generator and this was connected  We then tested impedances which were normal     The leads been anchored down using anchors under serial of fluoroscopy  I had to use duraseal to anchor down the paddle since we had to perform a significant laminectomy  After all hardware was in place we irrigated each wound  We left vancomycin powder within th wound  Then closed in layers using 2 0 Vicryl for the subcutaneous tissues and monocryl for the skin sterile dressing was placed  Patient was repositioned supine the hospital bed extubated to room air  She was taken to the postop recovery area stable condition        All needle and sponge counts were correct at the procedure  All neuromonitoring remained stable during the procedure      I was present for the entire procedure, A qualified resident physician was not available and A physician assistant was required during the procedure for retraction tissue handling,dissection and suturing    Patient Disposition:  PACU     SIGNATURE: Baldo Garduno MD  DATE: August 9, 2019  TIME: 1:59 PM    [facility]  [address]

## 2019-08-13 ENCOUNTER — TELEPHONE (OUTPATIENT)
Dept: NEUROSURGERY | Facility: CLINIC | Age: 59
End: 2019-08-13

## 2019-08-13 NOTE — TELEPHONE ENCOUNTER
Spoke with Reno Palomino to see how she is doing after surgery 8/9/2019  She reports that she is doing well overall and denies any incisional issues or fevers  Advised that after three days she may take a shower and gently wash the surgical site with soap and water  Use clean wash cloth, towels, and clothing  Do not submerge in water until cleared by the surgeon  Do not apply any creams, ointments, or lotions to the site  Patient has moved her bowels since the surgery  Encouraged her to take a stool softener, and add Miralax daily if bowels have not moved by next week  Patient educated to drink 10-12 glasses of water daily and increase her fiber intake  Also instructed to ambulate as tolerated to help with constipation and prevent blood clots  she has no further questions at this time  Verified date/time/location of her upcoming POV on 9/26/2019 and advised her to call the office with any further questions or concerns, or if any incisional issues or fevers would arise  Patient was appreciative for the call

## 2019-08-26 ENCOUNTER — OFFICE VISIT (OUTPATIENT)
Dept: NEUROSURGERY | Facility: CLINIC | Age: 59
End: 2019-08-26

## 2019-08-26 VITALS
WEIGHT: 186 LBS | HEIGHT: 67 IN | TEMPERATURE: 97.9 F | BODY MASS INDEX: 29.19 KG/M2 | HEART RATE: 96 BPM | DIASTOLIC BLOOD PRESSURE: 70 MMHG | RESPIRATION RATE: 16 BRPM | SYSTOLIC BLOOD PRESSURE: 116 MMHG

## 2019-08-26 DIAGNOSIS — M96.1 POSTLAMINECTOMY SYNDROME: ICD-10-CM

## 2019-08-26 DIAGNOSIS — Z48.89 AFTERCARE FOLLOWING SURGERY: ICD-10-CM

## 2019-08-26 DIAGNOSIS — Z96.89 STATUS POST INSERTION OF SPINAL CORD STIMULATOR: ICD-10-CM

## 2019-08-26 DIAGNOSIS — Z45.42 BATTERY END OF LIFE OF SPINAL CORD STIMULATOR: ICD-10-CM

## 2019-08-26 DIAGNOSIS — G89.4 CHRONIC PAIN SYNDROME: Primary | ICD-10-CM

## 2019-08-26 PROCEDURE — 99024 POSTOP FOLLOW-UP VISIT: CPT | Performed by: NURSE PRACTITIONER

## 2019-08-26 NOTE — PROGRESS NOTES
Assessment/Plan:  Diagnoses and all orders for this visit:    Postlaminectomy syndrome    Chronic pain syndrome    Aftercare following surgery    Battery end of life of spinal cord stimulator    Status post insertion of spinal cord stimulator        Subjective: This new stimulator is helping  Patient ID: Bakari Coburn is a 63 y o  female  HPI   She presents status post surgery w/ Dr Anita Parker on 8/9/2019 for : LAMINECTOMY THORACIC FOR INSERTION DORSAL COLUMN SPINAL CORD STIMULATOR (DCS) W IMPLANTABLE PULSE GENERATOR, RIGHT BUTTOCK  REMOVAL OF PRIOR SPINAL CORD STIMULATOR (Right Spine Thoracic)--BS   She reports 70 % efficacy for relief of chronic pain  She describes her back pain is better, continues with pain in legs , knees and thighs  She reports continues with burning sensation in thighs but  decreased intensity  Reports intermittent calf muscle spasm decreased frequency since surgery  She will return to medication regimen as before surgery  Methadone 5 mg 3 times per day, tizanidine 4 mg s every 8 hours and supplement with tylenol prn dosing  Incision in thoracic area and right buttock  Closure with  4 0 running monocryl suture  Both incisions are clean, dry and intact, without erythema, dehiscence, drainage or s/s of infection, well healed with well approximated wound edges  There is a scab running the length of the lower 1/2 of the thoracic incision  She is instructed not to remove scab, gently pat area during hygiene care, allow scab to disappear naturally  Cut knots on lateral ends of monocryl sutures in thoracic and right buttock area, she tolerated procedure well  Explained monocryl dissolvable sutures can take up to 90 days to dissolve        The following instructions are reviewed in detail and continue thru next 4 weeks (6 week post op)      At 2 weeks postop can resume restricted medications such as ASA, products containing ASA, NSAID, fish oils, and OTC products or as previously directed  Resume driving 2 week postoperatively  Continue to observe incisions for redness, drainage, swelling dehiscence, increased pain, fever >/= 101, warmth to touch incision or skin surrounding, if occur call or report to office immediately for reassessment  Continue showers using clean towel and wash cloth with OTC antibacterial body wash, pat dry after showering continue protocol for an additional 2 weeks  Do not apply lotions, creams, powder, or ointments to surgical incisions  Activity as tolerated, no bending, lifting  greater than 10 lbs, turning, stretching, no pulling, pushing  ambulation as tolerated  Refrain from strenuous activity, bending, and  twisting back  No Immersion in water such as swimming, hot tub, or tub bath  Informed of follow-up appointment in 4 weeks  If  there is any significant change in her neurologic status, call and/or return to the office immediately for reassessment   Will discuss during 6 week postoperative  visit physical therapy need and consult as indicated  She met with product rep for device programing and teaching  Rep reports after session with patient all involved chronic pain areas  are covered with thoracic Spinal Cord Stimulator system, td send session report PDF for attachment in note  Explained chronic pain relief may not be immediate after reprogramming can take  Up to 72 hours to feel effect   Contact Rep immediately if there is any change in efficacy or concern over SCS system  Contact office if unable to reach Rep or if the reps   intervention does  not resolve issue  Impressions and treatment recommendations were discussed in detail with the patient who verbalized understanding, all questions were answered and contact information was given in the event future questions arise        The following portions of the patient's history were reviewed and updated as appropriate:   She  has a past medical history of Chronic pain disorder, GERD (gastroesophageal reflux disease), Hyperlipidemia, Hypertension, Insomnia, and Psoriasis  She   Patient Active Problem List    Diagnosis Date Noted    Status post insertion of spinal cord stimulator 08/26/2019    Battery end of life of spinal cord stimulator 08/26/2019    Chronic pain syndrome 07/22/2019    Postlaminectomy syndrome 07/22/2019     She  has a past surgical history that includes Cholecystectomy; Lumbar fusion; Foot surgery (Right); and pr surg implnt neuroelect,epidural (Right, 8/9/2019)  Her family history includes Alzheimer's disease in her mother; Cancer in her father; Diabetes in her father; No Known Problems in her brother, brother, brother, and sister  She  reports that she has been smoking  She has a 10 00 pack-year smoking history  She has never used smokeless tobacco  She reports that she drinks alcohol  She reports that she has current or past drug history  Current Outpatient Medications   Medication Sig Dispense Refill    amLODIPine (NORVASC) 5 mg tablet Take 5 mg by mouth      atorvastatin (LIPITOR) 20 mg tablet TAKE 1 TABLET BY MOUTH EVERY DAY      DULoxetine (CYMBALTA) 60 mg delayed release capsule TAKE 1 CAPSULE BY MOUTH EVERY DAY      eszopiclone (LUNESTA) 2 mg tablet Take 2 mg by mouth      fluticasone (FLONASE) 50 mcg/act nasal spray 2 sprays into each nostril      gabapentin (NEURONTIN) 800 mg tablet Take 800 mg by mouth 3 (three) times a day       oxyCODONE-acetaminophen (PERCOCET) 5-325 mg per tablet Take 1 tablet by mouth every 4 (four) hours as needed for moderate pain Begin taking after surgery 8/9/2019  Max Daily Amount: 6 tablets 30 tablet 0    ranitidine (ZANTAC) 150 mg tablet Take 150 mg by mouth      TiZANidine (ZANAFLEX) 4 MG capsule Take 1 capsule (4 mg total) by mouth 3 (three) times a day Begin taking after surgery 8/9/2019  30 capsule 0    Calcium Carbonate-Vitamin D (CALCIUM 500/D PO) ONE TABLET BID      Cholecalciferol (VITAMIN D3) 1000 units CAPS Take by mouth      methadone (DOLOPHINE) 5 mg tablet TAKE 1/2 TABLET (2 5MG) BY MOUTH EVERY 8 HOURS FOR PAIN (ONGOING THERAPY)       No current facility-administered medications for this visit  She is allergic to estrogens and codeine       Review of Systems   Constitutional: Positive for fatigue  HENT: Negative  Eyes: Negative  Respiratory: Negative  Cardiovascular: Negative  Gastrointestinal: Negative  Endocrine: Negative  Genitourinary: Negative  Musculoskeletal: Positive for back pain (Medtronic SCS was for LBP and left leg, leads migrated  Pt had BS SCS trial for lbp and bilateral leg pain  )  Allergic/Immunologic: Negative  Neurological: Positive for weakness (bilateral legs) and numbness (bilateral legs)  Hematological:        See vascular   Psychiatric/Behavioral: Negative  Objective:      /70 (BP Location: Right arm)   Pulse 96   Temp 97 9 °F (36 6 °C) (Tympanic)   Resp 16   Ht 5' 7" (1 702 m)   Wt 84 4 kg (186 lb)   BMI 29 13 kg/m²          Physical Exam   Constitutional: She is oriented to person, place, and time  She appears well-nourished  No distress  HENT:   Head: Normocephalic and atraumatic  Eyes: EOM are normal  Right eye exhibits no discharge  Left eye exhibits no discharge  No scleral icterus  Cardiovascular: Normal rate and regular rhythm  Pulmonary/Chest: Effort normal  No respiratory distress  Musculoskeletal: She exhibits no edema  Neurological: She is alert and oriented to person, place, and time  She displays normal reflexes  No cranial nerve deficit or sensory deficit  She exhibits normal muscle tone  Coordination normal    Skin: Skin is warm and dry  She is not diaphoretic  Psychiatric: She has a normal mood and affect  Her behavior is normal    Nursing note and vitals reviewed

## 2019-09-23 ENCOUNTER — OFFICE VISIT (OUTPATIENT)
Dept: NEUROSURGERY | Facility: CLINIC | Age: 59
End: 2019-09-23

## 2019-09-23 VITALS
SYSTOLIC BLOOD PRESSURE: 98 MMHG | WEIGHT: 186 LBS | HEART RATE: 100 BPM | RESPIRATION RATE: 106 BRPM | DIASTOLIC BLOOD PRESSURE: 76 MMHG | HEIGHT: 67 IN | BODY MASS INDEX: 29.19 KG/M2

## 2019-09-23 DIAGNOSIS — Z48.89 AFTERCARE FOLLOWING SURGERY: Primary | ICD-10-CM

## 2019-09-23 PROCEDURE — 99024 POSTOP FOLLOW-UP VISIT: CPT | Performed by: NEUROLOGICAL SURGERY

## 2019-09-23 NOTE — PROGRESS NOTES
Assessment/Plan:    No problem-specific Assessment & Plan notes found for this encounter  Patient is 6 weeks post operative from a thoracic revision SCS  she has mild complaints  The patient reported good efficacy from surgery  Has recent left leg pain but reports good stimulator coverage she is happy with her current system  I have seen patient with contralateral pain with stimulation of the ipsilateral side  I also noted that she should follow-up with her PCP if the pain does not subside with reprogramming  All the patients restrictions are lifted  All wounds well healed or progressing as normal     She can follow-up in 1 year or PRN depending on her progress  Diagnoses and all orders for this visit:    Aftercare following surgery          Subjective:      Patient ID: Robb Chanel is a 63 y o  female  HPI    The following portions of the patient's history were reviewed and updated as appropriate: allergies, current medications, past family history, past medical history, past social history, past surgical history and problem list     Review of Systems   Constitutional: Negative for fatigue  HENT: Negative  Eyes: Negative  Respiratory: Negative  Cardiovascular: Negative  Gastrointestinal: Negative  Endocrine: Negative  Genitourinary: Negative  Musculoskeletal: Positive for back pain ([employer]tronic SCS was for LBP and left leg, leads migrated  Pt had BS SCS trial for lbp and bilateral leg pain  ) and joint swelling (bilateral foot with pain at times  )  C/o left leg pain from ankle up to groin at times into buttock  SCS is helping for LBP and right leg   Allergic/Immunologic: Negative  Neurological: Positive for weakness (bilateral legs) and numbness (bilateral legs)  Hematological:        See vascular   Psychiatric/Behavioral: Negative            Objective:      BP 98/76 (BP Location: Left arm)   Pulse 100   Resp (!) 106   Ht 5' 7" (1 702 m)   Wt 84 4 kg (186 lb)   BMI 29 13 kg/m²          Physical Exam   Constitutional: She is oriented to person, place, and time  She appears well-developed  HENT:   Head: Normocephalic  Eyes: Pupils are equal, round, and reactive to light  Pulmonary/Chest: Effort normal    Musculoskeletal: She exhibits no edema  Neurological: She is alert and oriented to person, place, and time  incisions well healed no significant leg edema

## (undated) DEVICE — TOOL 14MH30 LEGEND 14CM 3MM: Brand: MIDAS REX ™

## (undated) DEVICE — BETHLEHEM UNIVERSAL SPINE, KIT: Brand: CARDINAL HEALTH

## (undated) DEVICE — NEEDLE HYPO 22G X 1-1/2 IN

## (undated) DEVICE — TIBURON SPLIT SHEET: Brand: CONVERTORS

## (undated) DEVICE — SPONGE PVP SCRUB WING STERILE

## (undated) DEVICE — FLOSEAL HEMOSTATIC MATRIX, 10 ML: Brand: FLOSEAL

## (undated) DEVICE — DRAPE C-ARM X-RAY

## (undated) DEVICE — DURASEAL 5ML

## (undated) DEVICE — PREP SURGICAL PURPREP 26ML

## (undated) DEVICE — 35CM LONG TUNNELING TOOL

## (undated) DEVICE — Device

## (undated) DEVICE — ADHESIVE SKN CLSR HISTOACRYL FLEX 0.5ML LF

## (undated) DEVICE — SUT SILK 2-0 SH 30 IN K833H

## (undated) DEVICE — GLOVE SRG BIOGEL 6.5

## (undated) DEVICE — GLOVE INDICATOR PI UNDERGLOVE SZ 7.5 BLUE

## (undated) DEVICE — REMOTE CONTROL KIT: Brand: FREELINK™

## (undated) DEVICE — OR CABLE 2X8 61CM AND EXTENSION

## (undated) DEVICE — INTENDED FOR TISSUE SEPARATION, AND OTHER PROCEDURES THAT REQUIRE A SHARP SURGICAL BLADE TO PUNCTURE OR CUT.: Brand: BARD-PARKER SAFETY BLADES SIZE 10, STERILE

## (undated) DEVICE — VIAL DECANTER

## (undated) DEVICE — GLOVE INDICATOR PI UNDERGLOVE SZ 6.5 BLUE

## (undated) DEVICE — GLOVE SRG BIOGEL ECLIPSE 7

## (undated) DEVICE — CHARGING SYSTEM KIT: Brand: PRECISION™

## (undated) DEVICE — ELECTRODE BLADE MOD E-Z CLEAN  2.75IN 7CM -0012AM

## (undated) DEVICE — PENCIL ELECTROSURG E-Z CLEAN -0035H

## (undated) DEVICE — ANTIBACTERIAL VIOLET BRAIDED (POLYGLACTIN 910), SYNTHETIC ABSORBABLE SUTURE: Brand: COATED VICRYL

## (undated) DEVICE — HEX WRENCH, 7.6CM: Brand: CLIK™ ANCHOR

## (undated) DEVICE — SUT MONOCRYL 4-0 PS-2 18 IN Y496G